# Patient Record
Sex: MALE | Race: OTHER | NOT HISPANIC OR LATINO | URBAN - METROPOLITAN AREA
[De-identification: names, ages, dates, MRNs, and addresses within clinical notes are randomized per-mention and may not be internally consistent; named-entity substitution may affect disease eponyms.]

---

## 2019-05-20 ENCOUNTER — INPATIENT (INPATIENT)
Facility: HOSPITAL | Age: 54
LOS: 0 days | Discharge: HOME | DRG: 287 | End: 2019-05-20
Attending: INTERNAL MEDICINE | Admitting: INTERNAL MEDICINE
Payer: MEDICAID

## 2019-05-20 VITALS
DIASTOLIC BLOOD PRESSURE: 89 MMHG | HEART RATE: 57 BPM | TEMPERATURE: 98 F | OXYGEN SATURATION: 98 % | RESPIRATION RATE: 18 BRPM | SYSTOLIC BLOOD PRESSURE: 154 MMHG

## 2019-05-20 VITALS — DIASTOLIC BLOOD PRESSURE: 81 MMHG | TEMPERATURE: 98 F | SYSTOLIC BLOOD PRESSURE: 126 MMHG | HEART RATE: 55 BPM

## 2019-05-20 DIAGNOSIS — R07.9 CHEST PAIN, UNSPECIFIED: ICD-10-CM

## 2019-05-20 DIAGNOSIS — I10 ESSENTIAL (PRIMARY) HYPERTENSION: ICD-10-CM

## 2019-05-20 DIAGNOSIS — Z98.890 OTHER SPECIFIED POSTPROCEDURAL STATES: Chronic | ICD-10-CM

## 2019-05-20 LAB
ALBUMIN SERPL ELPH-MCNC: 4.3 G/DL — SIGNIFICANT CHANGE UP (ref 3.3–5)
ALP SERPL-CCNC: 54 U/L — SIGNIFICANT CHANGE UP (ref 40–120)
ALT FLD-CCNC: 20 U/L — SIGNIFICANT CHANGE UP (ref 10–45)
ANION GAP SERPL CALC-SCNC: 12 MMOL/L — SIGNIFICANT CHANGE UP (ref 5–17)
APTT BLD: 32.1 SEC — SIGNIFICANT CHANGE UP (ref 27.5–36.3)
AST SERPL-CCNC: 27 U/L — SIGNIFICANT CHANGE UP (ref 10–40)
BASOPHILS # BLD AUTO: 0.03 K/UL — SIGNIFICANT CHANGE UP (ref 0–0.2)
BASOPHILS NFR BLD AUTO: 0.6 % — SIGNIFICANT CHANGE UP (ref 0–2)
BILIRUB SERPL-MCNC: 0.8 MG/DL — SIGNIFICANT CHANGE UP (ref 0.2–1.2)
BUN SERPL-MCNC: 14 MG/DL — SIGNIFICANT CHANGE UP (ref 7–23)
CALCIUM SERPL-MCNC: 9.2 MG/DL — SIGNIFICANT CHANGE UP (ref 8.4–10.5)
CHLORIDE SERPL-SCNC: 100 MMOL/L — SIGNIFICANT CHANGE UP (ref 96–108)
CK MB CFR SERPL CALC: 3.2 NG/ML — SIGNIFICANT CHANGE UP (ref 0–6.7)
CK SERPL-CCNC: 236 U/L — HIGH (ref 30–200)
CO2 SERPL-SCNC: 27 MMOL/L — SIGNIFICANT CHANGE UP (ref 22–31)
CREAT SERPL-MCNC: 0.76 MG/DL — SIGNIFICANT CHANGE UP (ref 0.5–1.3)
EOSINOPHIL # BLD AUTO: 0.19 K/UL — SIGNIFICANT CHANGE UP (ref 0–0.5)
EOSINOPHIL NFR BLD AUTO: 4 % — SIGNIFICANT CHANGE UP (ref 0–6)
GLUCOSE SERPL-MCNC: 117 MG/DL — HIGH (ref 70–99)
HCT VFR BLD CALC: 42.9 % — SIGNIFICANT CHANGE UP (ref 39–50)
HGB BLD-MCNC: 15.1 G/DL — SIGNIFICANT CHANGE UP (ref 13–17)
IMM GRANULOCYTES NFR BLD AUTO: 0.4 % — SIGNIFICANT CHANGE UP (ref 0–1.5)
INR BLD: 0.98 — SIGNIFICANT CHANGE UP (ref 0.88–1.16)
LYMPHOCYTES # BLD AUTO: 1.33 K/UL — SIGNIFICANT CHANGE UP (ref 1–3.3)
LYMPHOCYTES # BLD AUTO: 28.1 % — SIGNIFICANT CHANGE UP (ref 13–44)
MCHC RBC-ENTMCNC: 29.8 PG — SIGNIFICANT CHANGE UP (ref 27–34)
MCHC RBC-ENTMCNC: 35.2 GM/DL — SIGNIFICANT CHANGE UP (ref 32–36)
MCV RBC AUTO: 84.6 FL — SIGNIFICANT CHANGE UP (ref 80–100)
MONOCYTES # BLD AUTO: 0.52 K/UL — SIGNIFICANT CHANGE UP (ref 0–0.9)
MONOCYTES NFR BLD AUTO: 11 % — SIGNIFICANT CHANGE UP (ref 2–14)
NEUTROPHILS # BLD AUTO: 2.65 K/UL — SIGNIFICANT CHANGE UP (ref 1.8–7.4)
NEUTROPHILS NFR BLD AUTO: 55.9 % — SIGNIFICANT CHANGE UP (ref 43–77)
NRBC # BLD: 0 /100 WBCS — SIGNIFICANT CHANGE UP (ref 0–0)
PLATELET # BLD AUTO: 245 K/UL — SIGNIFICANT CHANGE UP (ref 150–400)
POTASSIUM SERPL-MCNC: 3.5 MMOL/L — SIGNIFICANT CHANGE UP (ref 3.5–5.3)
POTASSIUM SERPL-SCNC: 3.5 MMOL/L — SIGNIFICANT CHANGE UP (ref 3.5–5.3)
PROT SERPL-MCNC: 7.4 G/DL — SIGNIFICANT CHANGE UP (ref 6–8.3)
PROTHROM AB SERPL-ACNC: 11.1 SEC — SIGNIFICANT CHANGE UP (ref 10–12.9)
RBC # BLD: 5.07 M/UL — SIGNIFICANT CHANGE UP (ref 4.2–5.8)
RBC # FLD: 13 % — SIGNIFICANT CHANGE UP (ref 10.3–14.5)
SODIUM SERPL-SCNC: 139 MMOL/L — SIGNIFICANT CHANGE UP (ref 135–145)
TROPONIN T SERPL-MCNC: <0.01 NG/ML — SIGNIFICANT CHANGE UP (ref 0–0.01)
TROPONIN T SERPL-MCNC: <0.01 NG/ML — SIGNIFICANT CHANGE UP (ref 0–0.01)
WBC # BLD: 4.74 K/UL — SIGNIFICANT CHANGE UP (ref 3.8–10.5)
WBC # FLD AUTO: 4.74 K/UL — SIGNIFICANT CHANGE UP (ref 3.8–10.5)

## 2019-05-20 PROCEDURE — 93010 ELECTROCARDIOGRAM REPORT: CPT

## 2019-05-20 PROCEDURE — 99222 1ST HOSP IP/OBS MODERATE 55: CPT

## 2019-05-20 PROCEDURE — 71045 X-RAY EXAM CHEST 1 VIEW: CPT | Mod: 26

## 2019-05-20 PROCEDURE — 99285 EMERGENCY DEPT VISIT HI MDM: CPT | Mod: 25

## 2019-05-20 PROCEDURE — 93306 TTE W/DOPPLER COMPLETE: CPT | Mod: 26

## 2019-05-20 RX ORDER — FLUTICASONE PROPIONATE 50 MCG
1 SPRAY, SUSPENSION NASAL
Qty: 0 | Refills: 0 | DISCHARGE

## 2019-05-20 RX ORDER — GABAPENTIN 400 MG/1
1 CAPSULE ORAL
Qty: 0 | Refills: 0 | DISCHARGE

## 2019-05-20 RX ORDER — ASPIRIN/CALCIUM CARB/MAGNESIUM 324 MG
1 TABLET ORAL
Qty: 0 | Refills: 0 | DISCHARGE

## 2019-05-20 RX ORDER — CARBINOXAMINE MALEATE 4 MG/5ML
1 SUSPENSION, EXTENDED RELEASE ORAL
Qty: 0 | Refills: 0 | DISCHARGE

## 2019-05-20 RX ORDER — SODIUM CHLORIDE 9 MG/ML
1000 INJECTION INTRAMUSCULAR; INTRAVENOUS; SUBCUTANEOUS
Refills: 0 | Status: DISCONTINUED | OUTPATIENT
Start: 2019-05-20 | End: 2019-05-20

## 2019-05-20 RX ORDER — POTASSIUM CHLORIDE 20 MEQ
40 PACKET (EA) ORAL ONCE
Refills: 0 | Status: COMPLETED | OUTPATIENT
Start: 2019-05-20 | End: 2019-05-20

## 2019-05-20 RX ORDER — ERGOCALCIFEROL 1.25 MG/1
1 CAPSULE ORAL
Qty: 0 | Refills: 0 | DISCHARGE

## 2019-05-20 RX ORDER — AMLODIPINE BESYLATE 2.5 MG/1
5 TABLET ORAL DAILY
Refills: 0 | Status: DISCONTINUED | OUTPATIENT
Start: 2019-05-20 | End: 2019-05-20

## 2019-05-20 RX ORDER — CLOPIDOGREL BISULFATE 75 MG/1
600 TABLET, FILM COATED ORAL ONCE
Refills: 0 | Status: COMPLETED | OUTPATIENT
Start: 2019-05-20 | End: 2019-05-20

## 2019-05-20 RX ORDER — ATORVASTATIN CALCIUM 80 MG/1
40 TABLET, FILM COATED ORAL AT BEDTIME
Refills: 0 | Status: DISCONTINUED | OUTPATIENT
Start: 2019-05-20 | End: 2019-05-20

## 2019-05-20 RX ORDER — ATENOLOL AND CHLORTHALIDONE 50; 25 MG/1; MG/1
1 TABLET ORAL
Qty: 0 | Refills: 0 | DISCHARGE

## 2019-05-20 RX ORDER — ASPIRIN/CALCIUM CARB/MAGNESIUM 324 MG
81 TABLET ORAL DAILY
Refills: 0 | Status: DISCONTINUED | OUTPATIENT
Start: 2019-05-20 | End: 2019-05-20

## 2019-05-20 RX ORDER — ATORVASTATIN CALCIUM 80 MG/1
1 TABLET, FILM COATED ORAL
Qty: 30 | Refills: 1
Start: 2019-05-20 | End: 2019-07-18

## 2019-05-20 RX ORDER — HEPARIN SODIUM 5000 [USP'U]/ML
5000 INJECTION INTRAVENOUS; SUBCUTANEOUS EVERY 8 HOURS
Refills: 0 | Status: DISCONTINUED | OUTPATIENT
Start: 2019-05-20 | End: 2019-05-20

## 2019-05-20 RX ORDER — ASPIRIN/CALCIUM CARB/MAGNESIUM 324 MG
325 TABLET ORAL ONCE
Refills: 0 | Status: COMPLETED | OUTPATIENT
Start: 2019-05-20 | End: 2019-05-20

## 2019-05-20 RX ORDER — AMLODIPINE BESYLATE 2.5 MG/1
1 TABLET ORAL
Qty: 0 | Refills: 0 | DISCHARGE

## 2019-05-20 RX ORDER — GABAPENTIN 400 MG/1
100 CAPSULE ORAL
Refills: 0 | Status: DISCONTINUED | OUTPATIENT
Start: 2019-05-20 | End: 2019-05-20

## 2019-05-20 RX ORDER — ASPIRIN/CALCIUM CARB/MAGNESIUM 324 MG
81 TABLET ORAL DAILY
Refills: 0 | Status: DISCONTINUED | OUTPATIENT
Start: 2019-05-21 | End: 2019-05-20

## 2019-05-20 RX ADMIN — GABAPENTIN 100 MILLIGRAM(S): 400 CAPSULE ORAL at 19:01

## 2019-05-20 RX ADMIN — Medication 325 MILLIGRAM(S): at 07:45

## 2019-05-20 RX ADMIN — Medication 40 MILLIEQUIVALENT(S): at 12:13

## 2019-05-20 RX ADMIN — CLOPIDOGREL BISULFATE 600 MILLIGRAM(S): 75 TABLET, FILM COATED ORAL at 12:13

## 2019-05-20 RX ADMIN — SODIUM CHLORIDE 75 MILLILITER(S): 9 INJECTION INTRAMUSCULAR; INTRAVENOUS; SUBCUTANEOUS at 12:14

## 2019-05-20 RX ADMIN — AMLODIPINE BESYLATE 5 MILLIGRAM(S): 2.5 TABLET ORAL at 12:13

## 2019-05-20 NOTE — ED PROVIDER NOTE - OBJECTIVE STATEMENT
54M pmh 54M pmh HTN, nonsmoker, not diabetic p/w CP intermittently, L sided, worse w/ exertion for 1 week, no sob, no n/v, no spreading. Seen by Dr. Yusuf Robb [facility] ([place], [place]) yesterday , noted for positive stress test , sent to ED. No fever, no chills. Pain worse if any coughing or sneezing.     Fam hx of brother  of MI at 50's  Pt speaks both english & Dominique, Daughter speaks both english & Dominique 54M pmh HTN, nonsmoker, not diabetic p/w CP intermittently, L sided, worse w/ exertion for 1 week, no sob, no n/v, no spreading. Seen by Dr. Yusuf Robb [facility] ([place], [place]) yesterday , noted for positive stress test , sent to ED. No fever, no chills. Pain worse if any coughing or sneezing.     Fam hx of brother  of MI at 50's  Pt speaks both english & Dominique, Daughter speaks both english & Punjabim, pt declined  at this time.

## 2019-05-20 NOTE — DISCHARGE NOTE PROVIDER - HOSPITAL COURSE
54YM English/Calderon Speaking with strong FH CAD (Brother  of AMI in 50’s), pmh HTN c/o intermittent, L sided, non-radiating dull/aching CP, 3/10 occurs w/ coughing, sneezing and sitting forward x 1 week.  Pt denies sob, orthopnea, pnd, n/v, fever, chills, diaphoresis, LE edema, palpitations, hematuria or melena.  Pt was seen by his cardiologist Dr. Robb yesterday  and was noted to have a positive exercise stress test and was recommended to present to ED.  In ED /86, HR 61, RR 16, O2 99% RA, T 98.3, EKG SB @ 44bpm without AV block or ST/Twave changes. CXR clear per ED read. Labs notable for trop negative x1, . Pt given aspirin 325 mg x1 in ED.  Pt currently CP free.  Pt admitted to 5 uris with plan for cardiac catheterization today with Dr. Robb.  Now s/p cardiac cath 19 which was non obstructive the mid LAD myocardial bridge, EF 65%, EDP 16, R radial access without hematoma, distal pulse intact after band removal.  Pt will continue aspirin 81 mg daily, atenolol/chlorthalidone 50-25 mg, norvasc 5 mg daily.  Pt discharged with atorvastatin 20 mg daily. Pt deemed stable for discharge per Dr. Durant and will follow up with Dr. Robb on 19 at the [place] office.

## 2019-05-20 NOTE — ED PROVIDER NOTE - CLINICAL SUMMARY MEDICAL DECISION MAKING FREE TEXT BOX
54M pmh HTN, nonsmoker, not diabetic p/w CP intermittently, L sided, worse w/ exertion for 1 week, no sob, no n/v, no spreading. Seen by Dr. Yusuf Robb [facility] ([place], [place]) yesterday , noted for positive stress test , sent to ED. Fam hx of brother  of MI at 50's. Exam no acute findings, ekg not acutely ischemic. Concern ACS, less likely PE, dissection, 54M pmh HTN, nonsmoker, not diabetic p/w CP intermittently, L sided, worse w/ exertion for 1 week, no sob, no n/v, no spreading. Seen by Dr. Yusuf Gimenez ([place], [place]) yesterday , noted for positive stress test , sent to ED. Fam hx of brother  of MI at 50's. Exam no acute findings, ekg not acutely ischemic. Concern ACS, less likely PE, dissection, no STEMI criteria. SInus sam but asymptomatic. Discussed w/ Cards Cath attending & fellow, plan for cath today. Discussed Dr. Durant, accepted cards tele.

## 2019-05-20 NOTE — DISCHARGE NOTE PROVIDER - CARE PROVIDER_API CALL
Yusuf Robb (MD)  Cardiovascular Disease; Internal Medicine  [address] [place] [address]  Phone: [phone]  Fax: [phone]  Follow Up Time:

## 2019-05-20 NOTE — H&P ADULT - RS GEN PE MLT RESP DETAILS PC
breath sounds equal/normal/airway patent/good air movement/clear to auscultation bilaterally/respirations non-labored

## 2019-05-20 NOTE — ED ADULT NURSE REASSESSMENT NOTE - NS ED NURSE REASSESS COMMENT FT1
Pt assessed, on continuous monitoring.  Denies chest pain while at rest, however states pain is worse when sitting up.  VSS, lungs CTA, clear S1, S2.  Will continue to monitor.

## 2019-05-20 NOTE — H&P ADULT - ATTENDING COMMENTS
See PA note written above, for details. I reviewed the documentation.  I reviewed vitals, labs, medications, cardiac studies and additional imaging 5/20/19.  I agree with the PA's findings and plans as written above with the following additions/amendments:    54M with HTN who presents for LHC in context of abnormal exercise stress test and unstable angina. In the ED, troponin negative x1, pt given aspirin 325 mg and admitted to cardiac telemetry  Physical Exam notable for: elderly male sitting up in bed in NAD, FLAT neck veins, RRR, no MGR detected, clear lungs, flat abdomen, NTTP, no fluid wave detected, 2+ peripheral pulses, no pretibial pitting edema, no ankle edema, skin WWP throughout, dry skin, A&Ox3, independently ambulatory with no assistance  Plan for:  NPO for LHC today 5/20 with Dr. Robb  DAPT loaded with ASA/Plavix  High intensity statin Atorva 40qHS pending lipids  Holding home Atenolol given resting SB 50s, resume on 5/21 pending tele  Holding Chlorthalidone given euvolemic-dry exam and anticipated contrast load  Continue home Amlodipine for BP control  Order HbA1c, TFTs, FLP  Obtain TTE for structural assessment  Anticipated discharge pending results of coronary angiogram. May dispo today 5/20 if dx cath vs 5/21 if PCI  CLEMENTINA Berrios.  Cardiology Attending

## 2019-05-20 NOTE — ED PROVIDER NOTE - PHYSICAL EXAMINATION
VITAL SIGNS: I have reviewed nursing notes and confirm.  CONSTITUTIONAL: Well-developed; well-nourished; in no acute distress.  SKIN: Skin is warm and dry, no acute rash.  HEAD: Normocephalic; atraumatic.  EYES:  EOM intact; conjunctiva and sclera clear.  ENT: No nasal discharge; airway clear.  NECK: Supple; Voluntary FROM  CARD: No rubs appreciated, sam rate and rhythm.  RESP: No wheezes, no rales. No respiratory distress  ABD: Soft; non-distended; non-tender; no rebound or guarding  EXT: Normal ROM. No cyanosis or edema.  NEURO: Alert, oriented. Grossly unremarkable.  PSYCH: Cooperative, appropriate.

## 2019-05-20 NOTE — H&P ADULT - PROBLEM SELECTOR PLAN 2
Currently 134/86  -continue amlodipine 5mg    DVT ppx: subcutaneous heparin  Dispo: pending cardiac cath Currently 134/86  -continue amlodipine 5mg  -holding home atenolol/chlorthalidone since pt currently SB 40-50's without AV block    DVT ppx: subcutaneous heparin  Dispo: pending cardiac cath Currently 134/86  -continue home amlodipine 5mg  -holding home atenolol/chlorthalidone since pt currently SB 40-50's without AV block and asymptomatic. Pt took home dose this AM     DVT ppx: subcutaneous heparin  Dispo: pending cardiac cath

## 2019-05-20 NOTE — DISCHARGE NOTE NURSING/CASE MANAGEMENT/SOCIAL WORK - NSDCDPATPORTLINK_GEN_ALL_CORE
You can access the [email or web address], offered by [facility], by registering with the following website: [email or web address]

## 2019-05-20 NOTE — ED ADULT NURSE NOTE - CHPI ED NUR SYMPTOMS NEG
no shortness of breath/no fever/no nausea/no dizziness/no vomiting/no congestion/no diaphoresis/no syncope/no back pain/no chills

## 2019-05-20 NOTE — ED PROVIDER NOTE - PROGRESS NOTE DETAILS
Dar w/ Ced, Cath fellow, will plan for cath today. Tele admit. Accepted By Dr. Durant. Pt still sinus sam, no lightheaded, no sob, cp since arrival.

## 2019-05-20 NOTE — H&P ADULT - PROBLEM SELECTOR PLAN 1
-currently chest pain...  -trop negative x1,  f/u CE @ 12 pm and 4 pm  -aspirin 325 mg x1 in ED  -loaded with plavix....   -ECHO ordered   -NPO for cath w/ Dr. Robb today   -f/u lipid panel, HgbAIC, TSH -currently chest pain free   -trop negative x1,  f/u CE @ 12 pm and 4 pm  -aspirin 325 mg x1 in ED  -loaded with plavix....   -ECHO ordered   -NPO for cath w/ Dr. Robb today   -f/u lipid panel, HgbAIC, TSH -currently chest pain free   -trop negative x1,  f/u CE @ 12 pm and 4 pm  -aspirin 325 mg x1 in ED  -loaded with plavix 600mg x1  -ECHO ordered, EF normal per Dr. Robb   -pending fax of stress test   -NPO for cath w/ Dr. Robb today   -f/u lipid panel, HgbAIC, TSH

## 2019-05-20 NOTE — ED PROVIDER NOTE - DIAGNOSTIC INTERPRETATION
Chest x-ray interpreted by ER Physician Dr. Whitehead  Findings: heart size normal, no infiltrates, lungs fully expanded, soft tissues appear normal.

## 2019-05-20 NOTE — DISCHARGE NOTE PROVIDER - NSDCCPCAREPLAN_GEN_ALL_CORE_FT
PRINCIPAL DISCHARGE DIAGNOSIS  Diagnosis: Chest pain, unspecified type  Assessment and Plan of Treatment: The chest pain your experienced was not due to a heart attack or any blockages in the arteries of the heart. PRINCIPAL DISCHARGE DIAGNOSIS  Diagnosis: Chest pain, unspecified type  Assessment and Plan of Treatment: The chest pain your experienced was not due to a heart attack or any blockages in the arteries of the heart. The pain you experienced may have been due to a spasm of the heart muscle.  You underwent a diagnostic cardiac catheterization and the procedure as done through the right wrist.   You can remove the dressing after 24 hours and then you do not need to keep this area covered and you may shower. No soaking in a bathtub, hotub or swimming for 5 days.  Please avoid any heavy lifting  (no more than 3 to 5 lbs) or strenuous activity for five days.  If you develop any swelling, bleeding, hardening of the skin (hematoma formation), acute pain, numbness/tingling  in your arm or leg please contact your doctor immediately or call our 24/7 line: [phone]        SECONDARY DISCHARGE DIAGNOSES  Diagnosis: Hyperlipidemia  Assessment and Plan of Treatment: Please start atorvastatin (lipitor) 20 mg daily. The medication was sent to your pharamcy.    Diagnosis: Hypertension  Assessment and Plan of Treatment: Please continue your home blood pressure medications PRINCIPAL DISCHARGE DIAGNOSIS  Diagnosis: Chest pain, unspecified type  Assessment and Plan of Treatment: The chest pain your experienced was not due to a heart attack or any blockages in the arteries of the heart. The pain you experienced may have been due to a spasm of the heart muscle.  You underwent a diagnostic cardiac catheterization and the procedure as done through the right wrist.   You can remove the dressing after 24 hours and then you do not need to keep this area covered and you may shower. No soaking in a bathtub, hotub or swimming for 5 days.  Please avoid any heavy lifting  (no more than 3 to 5 lbs) or strenuous activity for five days.  If you develop any swelling, bleeding, hardening of the skin (hematoma formation), acute pain, numbness/tingling  in your arm  please contact your doctor immediately or call our 24/7 line: [phone]        SECONDARY DISCHARGE DIAGNOSES  Diagnosis: Hyperlipidemia  Assessment and Plan of Treatment: Please start atorvastatin (lipitor) 20 mg daily. The medication was sent to your pharamcy.    Diagnosis: Hypertension  Assessment and Plan of Treatment: Please continue your home blood pressure medications

## 2019-05-20 NOTE — H&P ADULT - HISTORY OF PRESENT ILLNESS
SKELETON     54YM English/Calderon Speaking with strong FH CAD (Brother  of AMI in 50’s), pmh HTN c/o intermittent, L sided, non-radiating CP, ____/10, worse w/ exertion x 1 week and reproducible with coughing?    Pt denies sob, no n/v, fever, chills  Pt was seen by his cardiologist Dr. Robb yesterday  and was noted to have a positive stress test and recommended to present to ED. No fever, no chills.  In ED /86, HR 61, RR 16, O2 99% RA, T 98.3, EKG SB @ 44bpm without AV block or ST/Twave changes. CXR clear per ED read. Labs notable for trop negative x1, . Pt given aspirin 325 mg x1. Pt admitted to 5 uris with plan for cardiac catheterization today with Dr. Robb. 54YM English/Calderon Speaking with strong FH CAD (Brother  of AMI in 50’s), pmh HTN c/o intermittent, L sided, non-radiating CP, 3/10 occurs w/ coughing, sneezing and sitting forward.  Pt denies sob, orthopnea, pnd, n/v, fever, chills, diaphoresis, LE edema, palpitations, hematuria or melena.  Pt was seen by his cardiologist Dr. Robb yesterday  and was noted to have a positive exercise stress test and was recommended to present to ED.  In ED /86, HR 61, RR 16, O2 99% RA, T 98.3, EKG SB @ 44bpm without AV block or ST/Twave changes. CXR clear per ED read. Labs notable for trop negative x1, . Pt given aspirin 325 mg x1 in ED.  Pt currently CP free.  Pt admitted to 5 uris with plan for cardiac catheterization today with Dr. Robb.

## 2019-05-20 NOTE — H&P ADULT - ADDITIONAL PE
ASA:  Mallampati:  EKG: SB @ 44 bpm, no AV block, no ST/T wave changes ASA: II  Mallampati: III  EKG: SB @ 44 bpm, no AV block, no ST/T wave changes

## 2019-05-20 NOTE — H&P ADULT - ASSESSMENT
54YM English/Calderon Speaking with strong FH CAD (Brother  of AMI in 50’s), pmh HTN c/o intermittent, L sided, non-radiating CP, 3/10 occurs w/ coughing, sneezing and sitting forward. Pt was seen by his cardiologist Dr. Robb yesterday  and was noted to have a positive exercise stress test and was recommended to present to ED. trop negative x1, . Pt given aspirin 325 mg x1 in ED.  Pt admitted to 5 uris with plan for cardiac catheterization today with Dr. Robb.       Risks & benefits of procedure and alternative therapy have been explained to the patient including but not limited to: allergic reaction, bleeding w/possible need for blood transfusion, infection, renal and vascular compromise, limb damage, arrhythmia, stroke, vessel dissection/perforation, Myocardial infarction, emergent CABG. Informed consent obtained and in chart.

## 2019-05-20 NOTE — ED ADULT NURSE NOTE - OBJECTIVE STATEMENT
Came in to ED c/o Chest pain x 1 week. 5/10, pressure, no radiation, no vomiting, no diaphoresis. No dizziness, no headache. No SOB, per patient CP started while he was gardening and worsens with exertion. No other c/o voiced at this time. Came in to ED c/o midsternal Chest pain x 1 week. 5/10, pressure, no radiation, no vomiting, no diaphoresis. No dizziness, no headache. No SOB, per patient CP started while he was gardening and worsens with exertion. No other c/o voiced at this time.

## 2019-05-20 NOTE — H&P ADULT - NSHPLABSRESULTS_GEN_ALL_CORE
15.1   4.74  )-----------( 245      ( 20 May 2019 07:34 )             42.9       05-20    139  |  100  |  14  ----------------------------<  117<H>  3.5   |  27  |  0.76    Ca    9.2      20 May 2019 07:34    TPro  7.4  /  Alb  4.3  /  TBili  0.8  /  DBili  x   /  AST  27  /  ALT  20  /  AlkPhos  54  05-20      PT/INR - ( 20 May 2019 07:34 )   PT: 11.1 sec;   INR: 0.98          PTT - ( 20 May 2019 07:34 )  PTT:32.1 sec    CARDIAC MARKERS ( 20 May 2019 07:34 )  x     / <0.01 ng/mL / 268 U/L / x     / 3.2 ng/mL            EKG:

## 2019-05-20 NOTE — H&P ADULT - NSICDXFAMILYHX_GEN_ALL_CORE_FT
FAMILY HISTORY:  Family history of acute myocardial infarction, Brother- in 50's FAMILY HISTORY:  Family history of acute myocardial infarction, Brother- in 50's  Family history of CVA, Father

## 2019-05-24 DIAGNOSIS — R07.9 CHEST PAIN, UNSPECIFIED: ICD-10-CM

## 2019-05-24 DIAGNOSIS — I10 ESSENTIAL (PRIMARY) HYPERTENSION: ICD-10-CM

## 2019-05-24 DIAGNOSIS — E78.5 HYPERLIPIDEMIA, UNSPECIFIED: ICD-10-CM

## 2019-05-24 DIAGNOSIS — Z82.49 FAMILY HISTORY OF ISCHEMIC HEART DISEASE AND OTHER DISEASES OF THE CIRCULATORY SYSTEM: ICD-10-CM

## 2019-10-31 PROCEDURE — C1887: CPT

## 2019-10-31 PROCEDURE — 93306 TTE W/DOPPLER COMPLETE: CPT

## 2019-10-31 PROCEDURE — 84443 ASSAY THYROID STIM HORMONE: CPT

## 2019-10-31 PROCEDURE — 80061 LIPID PANEL: CPT

## 2019-10-31 PROCEDURE — 71045 X-RAY EXAM CHEST 1 VIEW: CPT

## 2019-10-31 PROCEDURE — 99285 EMERGENCY DEPT VISIT HI MDM: CPT

## 2019-10-31 PROCEDURE — 85610 PROTHROMBIN TIME: CPT

## 2019-10-31 PROCEDURE — 85730 THROMBOPLASTIN TIME PARTIAL: CPT

## 2019-10-31 PROCEDURE — 83036 HEMOGLOBIN GLYCOSYLATED A1C: CPT

## 2019-10-31 PROCEDURE — 80053 COMPREHEN METABOLIC PANEL: CPT

## 2019-10-31 PROCEDURE — 93005 ELECTROCARDIOGRAM TRACING: CPT

## 2019-10-31 PROCEDURE — 84484 ASSAY OF TROPONIN QUANT: CPT

## 2019-10-31 PROCEDURE — C1769: CPT

## 2019-10-31 PROCEDURE — 82553 CREATINE MB FRACTION: CPT

## 2019-10-31 PROCEDURE — 36415 COLL VENOUS BLD VENIPUNCTURE: CPT

## 2019-10-31 PROCEDURE — 82550 ASSAY OF CK (CPK): CPT

## 2019-10-31 PROCEDURE — 85025 COMPLETE CBC W/AUTO DIFF WBC: CPT

## 2022-08-24 NOTE — ED ADULT NURSE NOTE - RESPIRATORY WDL
Breathing spontaneous and unlabored. Breath sounds clear and equal bilaterally with regular rhythm. Mohs Rapid Report Verbiage: The area of clinically evident tumor was marked with skin marking ink and appropriately hatched.  The initial incision was made following the Mohs approach through the skin.  The specimen was taken to the lab, divided into the necessary number of pieces, chromacoded and processed according to the Mohs protocol.  This was repeated in successive stages until a tumor free defect was achieved.

## 2022-10-11 NOTE — ED ADULT NURSE NOTE - NSFALLRSKUNASSIST_ED_ALL_ED
Patient reports having higher blood sugars 2-2.5 hours after having morning snack, concerned if pain and/or thyroid medication is causing this. Discussed trying different snack options to see if has an effect. Also reported having glucose lows more often than would like and having to correct. 
no

## 2023-01-20 NOTE — PATIENT PROFILE ADULT - ARE SIGNIFICANT INDICATORS COMPLETE.
Do labs  Do right hip x-rays  Continue hydroxychloroquine twice a day; continue regular eye exams per eye doctor  Continue over the counter tylenol and ibuprofen as needed    Return to clinic in 6 months No

## 2023-10-25 NOTE — PATIENT PROFILE ADULT - INTERPRETER'S NAME
Progress Note- Thanh Diamond 82 y o  male MRN: [id number]    Unit/Bed#: ENDO POOL Encounter: [id number]      Assessment and Plan:    82year old male with family history of colon cancer in his brother presenting with abdominal pain and hematochezia  Hb relatively stable  Plan for EGD and colonoscopy now   ______________________________________________________________________    Subjective:     Pt tolerated bowel prep      Medication Administration - last 24 hours from 07/16/2018 1426 to 07/17/2018 1426       Date/Time Order Dose Route Action Action by     07/17/2018 0858 allopurinol (ZYLOPRIM) tablet 100 mg 100 mg Oral Given Christel Vivi, RN     07/16/2018 2350 amitriptyline (ELAVIL) tablet 25 mg 25 mg Oral Given Carolann Perdue, RN     07/17/2018 0858 aspirin (ECOTRIN LOW STRENGTH) EC tablet 81 mg 81 mg Oral Given Christel Vivi, RN     07/17/2018 0858 atorvastatin (LIPITOR) tablet 40 mg 40 mg Oral Given Christel Vivi, RN     07/17/2018 0858 carvedilol (COREG) tablet 25 mg 25 mg Oral Given Christel Vivi, RN     07/16/2018 1702 carvedilol (COREG) tablet 25 mg 25 mg Oral Given Tariq Diamond, KARAN     07/17/2018 0906 diltiazem (CARDIZEM SR) 12 hr capsule 180 mg 180 mg Oral Given Christel Vivi, RN     07/16/2018 2350 diltiazem (CARDIZEM SR) 12 hr capsule 180 mg 180 mg Oral Given Carolann Perdue, RN     07/17/2018 0858 furosemide (LASIX) tablet 20 mg 20 mg Oral Given Christel Vivi, RN     88/16/0489 6321 mycophenolic acid (MYFORTIC) EC tablet 180 mg 180 mg Oral Given Christel Vivi, RN     55/09/6492 9071 mycophenolic acid (MYFORTIC) EC tablet 180 mg 180 mg Oral Given Tariq Diamond, RN     07/17/2018 6314 predniSONE tablet 2 5 mg 2 5 mg Oral Given Christel Vivi, RN     07/17/2018 0858 tacrolimus (PROGRAF) capsule 1 mg 1 mg Oral Given Christel Trinidad, RN     07/16/2018 1702 tacrolimus (PROGRAF) capsule 1 mg 1 mg Oral Given Tariq Diamond, KARAN     07/16/2018 5150 zolpidem (AMBIEN) tablet 10 mg
10 mg Oral Given Austin Akhtar RN     07/17/2018 0858 pantoprazole (PROTONIX) injection 40 mg 40 mg Intravenous Given Sabrina Myers RN     07/16/2018 1540 sodium chloride 0 9 % infusion 0 mL/hr Intravenous Stopped Kaleb Matt, KARAN     07/16/2018 1536 bisacodyl (DULCOLAX) EC tablet 10 mg 10 mg Oral Given Kaleb Matt RN     07/16/2018 1702 polyethylene glycol (GOLYTELY) bowel prep 4,000 mL 4,000 mL Oral Given Kaleb Matt RN     07/17/2018 0929 sodium chloride 0 9 % infusion 100 mL/hr Intravenous New Bag Sabrina Myers RN          Objective:     Vitals: Blood pressure 108/67, pulse 83, temperature 97 5 °F (36 4 °C), temperature source Tympanic, resp  rate 18, height 5' 6" (1 676 m), weight 96 kg (211 lb 10 3 oz), SpO2 98 %  ,Body mass index is 34 16 kg/m²        Intake/Output Summary (Last 24 hours) at 07/17/18 1426  Last data filed at 07/17/18 0821   Gross per 24 hour   Intake             1003 ml   Output              700 ml   Net              303 ml       Physical Exam:   General Appearance: Awake and alert, in no acute distress  Abdomen: Soft, non-tender, non-distended; bowel sounds normal; no masses or no organomegaly    Invasive Devices     Peripheral Intravenous Line            Peripheral IV 07/15/18 Right Antecubital 2 days                Lab Results:  Admission on 07/15/2018   Component Date Value    WBC 07/15/2018 10 03     RBC 07/15/2018 4 54     Hemoglobin 07/15/2018 13 8     Hematocrit 07/15/2018 42 0     MCV 07/15/2018 93     MCH 07/15/2018 30 4     MCHC 07/15/2018 32 9     RDW 07/15/2018 15 8*    MPV 07/15/2018 9 7     Platelets 18/17/9625 216     nRBC 07/15/2018 0     Neutrophils Relative 07/15/2018 57     Immat GRANS % 07/15/2018 0     Lymphocytes Relative 07/15/2018 33     Monocytes Relative 07/15/2018 8     Eosinophils Relative 07/15/2018 2     Basophils Relative 07/15/2018 0     Neutrophils Absolute 07/15/2018 5 67     Immature Grans Absolute 07/15/2018 0 03
 Lymphocytes Absolute 07/15/2018 3 30     Monocytes Absolute 07/15/2018 0 82     Eosinophils Absolute 07/15/2018 0 18     Basophils Absolute 07/15/2018 0 03     Sodium 07/15/2018 137     Potassium 07/15/2018 5 0     Chloride 07/15/2018 106     CO2 07/15/2018 24     Anion Gap 07/15/2018 7     BUN 07/15/2018 30*    Creatinine 07/15/2018 1 80*    Glucose 07/15/2018 147*    Calcium 07/15/2018 8 3     AST 07/15/2018 34     ALT 07/15/2018 23     Alkaline Phosphatase 07/15/2018 86     Total Protein 07/15/2018 7 6     Albumin 07/15/2018 3 3*    Total Bilirubin 07/15/2018 0 67     eGFR 07/15/2018 35     Lipase 07/15/2018 87     Ventricular Rate 07/15/2018 89     Atrial Rate 07/15/2018 89     LA Interval 07/15/2018 172     QRSD Interval 07/15/2018 90     QT Interval 07/15/2018 358     QTC Interval 07/15/2018 435     P Axis 07/15/2018 77     QRS Axis 07/15/2018 100     T Wave Axis 07/15/2018 88     Hemoglobin 07/15/2018 12 2     Hemoglobin 07/16/2018 11 9*    Sodium 07/16/2018 137     Potassium 07/16/2018 4 2     Chloride 07/16/2018 105     CO2 07/16/2018 26     Anion Gap 07/16/2018 6     BUN 07/16/2018 22     Creatinine 07/16/2018 1 58*    Glucose 07/16/2018 115     Glucose, Fasting 07/16/2018 115*    Calcium 07/16/2018 8 3     eGFR 07/16/2018 40     WBC 07/17/2018 7 39     RBC 07/17/2018 3 93     Hemoglobin 07/17/2018 11 9*    Hematocrit 07/17/2018 36 6     MCV 07/17/2018 93     MCH 07/17/2018 30 3     MCHC 07/17/2018 32 5     RDW 07/17/2018 15 7*    Platelets 08/90/8247 176     MPV 07/17/2018 9 9     Sodium 07/17/2018 141     Potassium 07/17/2018 3 6     Chloride 07/17/2018 108     CO2 07/17/2018 25     Anion Gap 07/17/2018 8     BUN 07/17/2018 18     Creatinine 07/17/2018 1 40*    Glucose 07/17/2018 87     Calcium 07/17/2018 8 4     eGFR 07/17/2018 47        Imaging Studies: I have personally reviewed pertinent imaging studies 
Sweety
PAST MEDICAL HISTORY:  Chronic pansinusitis     Chronic sinusitis     Seasonal allergic rhinitis

## 2025-04-09 ENCOUNTER — EMERGENCY (EMERGENCY)
Facility: HOSPITAL | Age: 60
LOS: 1 days | End: 2025-04-09
Attending: STUDENT IN AN ORGANIZED HEALTH CARE EDUCATION/TRAINING PROGRAM
Payer: COMMERCIAL

## 2025-04-09 VITALS
DIASTOLIC BLOOD PRESSURE: 108 MMHG | HEART RATE: 78 BPM | OXYGEN SATURATION: 96 % | TEMPERATURE: 98 F | HEIGHT: 69 IN | SYSTOLIC BLOOD PRESSURE: 169 MMHG | WEIGHT: 160.94 LBS | RESPIRATION RATE: 18 BRPM

## 2025-04-09 DIAGNOSIS — Z98.890 OTHER SPECIFIED POSTPROCEDURAL STATES: Chronic | ICD-10-CM

## 2025-04-09 PROCEDURE — 99282 EMERGENCY DEPT VISIT SF MDM: CPT

## 2025-04-09 PROCEDURE — 99285 EMERGENCY DEPT VISIT HI MDM: CPT | Mod: 25

## 2025-04-09 RX ORDER — ACETAMINOPHEN 500 MG/5ML
1000 LIQUID (ML) ORAL ONCE
Refills: 0 | Status: COMPLETED | OUTPATIENT
Start: 2025-04-09 | End: 2025-04-09

## 2025-04-10 VITALS
SYSTOLIC BLOOD PRESSURE: 158 MMHG | DIASTOLIC BLOOD PRESSURE: 78 MMHG | RESPIRATION RATE: 16 BRPM | OXYGEN SATURATION: 97 % | TEMPERATURE: 98 F | HEART RATE: 70 BPM

## 2025-04-10 LAB
ADD ON TEST-SPECIMEN IN LAB: SIGNIFICANT CHANGE UP
ALBUMIN SERPL ELPH-MCNC: 4.1 G/DL — SIGNIFICANT CHANGE UP (ref 3.3–5)
ALP SERPL-CCNC: 117 U/L — SIGNIFICANT CHANGE UP (ref 40–120)
ALT FLD-CCNC: 22 U/L — SIGNIFICANT CHANGE UP (ref 10–45)
ANION GAP SERPL CALC-SCNC: 13 MMOL/L — SIGNIFICANT CHANGE UP (ref 5–17)
ANION GAP SERPL CALC-SCNC: 14 MMOL/L — SIGNIFICANT CHANGE UP (ref 5–17)
APPEARANCE UR: CLEAR — SIGNIFICANT CHANGE UP
AST SERPL-CCNC: 22 U/L — SIGNIFICANT CHANGE UP (ref 10–40)
BACTERIA # UR AUTO: NEGATIVE /HPF — SIGNIFICANT CHANGE UP
BASOPHILS # BLD AUTO: 0.03 K/UL — SIGNIFICANT CHANGE UP (ref 0–0.2)
BASOPHILS NFR BLD AUTO: 0.3 % — SIGNIFICANT CHANGE UP (ref 0–2)
BILIRUB SERPL-MCNC: 1 MG/DL — SIGNIFICANT CHANGE UP (ref 0.2–1.2)
BILIRUB UR-MCNC: NEGATIVE — SIGNIFICANT CHANGE UP
BUN SERPL-MCNC: 12 MG/DL — SIGNIFICANT CHANGE UP (ref 7–23)
BUN SERPL-MCNC: 12 MG/DL — SIGNIFICANT CHANGE UP (ref 7–23)
CALCIUM SERPL-MCNC: 8.5 MG/DL — SIGNIFICANT CHANGE UP (ref 8.4–10.5)
CALCIUM SERPL-MCNC: 9 MG/DL — SIGNIFICANT CHANGE UP (ref 8.4–10.5)
CAST: 0 /LPF — SIGNIFICANT CHANGE UP (ref 0–4)
CHLORIDE SERPL-SCNC: 100 MMOL/L — SIGNIFICANT CHANGE UP (ref 96–108)
CHLORIDE SERPL-SCNC: 104 MMOL/L — SIGNIFICANT CHANGE UP (ref 96–108)
CO2 SERPL-SCNC: 20 MMOL/L — LOW (ref 22–31)
CO2 SERPL-SCNC: 21 MMOL/L — LOW (ref 22–31)
COLOR SPEC: YELLOW — SIGNIFICANT CHANGE UP
CREAT SERPL-MCNC: 1.43 MG/DL — HIGH (ref 0.5–1.3)
CREAT SERPL-MCNC: 1.5 MG/DL — HIGH (ref 0.5–1.3)
DIFF PNL FLD: ABNORMAL
EGFR: 54 ML/MIN/1.73M2 — LOW
EGFR: 54 ML/MIN/1.73M2 — LOW
EGFR: 57 ML/MIN/1.73M2 — LOW
EGFR: 57 ML/MIN/1.73M2 — LOW
EOSINOPHIL # BLD AUTO: 0.12 K/UL — SIGNIFICANT CHANGE UP (ref 0–0.5)
EOSINOPHIL NFR BLD AUTO: 1.3 % — SIGNIFICANT CHANGE UP (ref 0–6)
FLUAV AG NPH QL: SIGNIFICANT CHANGE UP
FLUBV AG NPH QL: SIGNIFICANT CHANGE UP
GAS PNL BLDV: SIGNIFICANT CHANGE UP
GLUCOSE SERPL-MCNC: 106 MG/DL — HIGH (ref 70–99)
GLUCOSE SERPL-MCNC: 117 MG/DL — HIGH (ref 70–99)
GLUCOSE UR QL: NEGATIVE MG/DL — SIGNIFICANT CHANGE UP
HCT VFR BLD CALC: 40.9 % — SIGNIFICANT CHANGE UP (ref 39–50)
HGB BLD-MCNC: 14.1 G/DL — SIGNIFICANT CHANGE UP (ref 13–17)
IMM GRANULOCYTES NFR BLD AUTO: 0.3 % — SIGNIFICANT CHANGE UP (ref 0–0.9)
KETONES UR-MCNC: NEGATIVE MG/DL — SIGNIFICANT CHANGE UP
LEUKOCYTE ESTERASE UR-ACNC: NEGATIVE — SIGNIFICANT CHANGE UP
LIDOCAIN IGE QN: 21 U/L — SIGNIFICANT CHANGE UP (ref 7–60)
LYMPHOCYTES # BLD AUTO: 1.16 K/UL — SIGNIFICANT CHANGE UP (ref 1–3.3)
LYMPHOCYTES # BLD AUTO: 12.7 % — LOW (ref 13–44)
MCHC RBC-ENTMCNC: 29.1 PG — SIGNIFICANT CHANGE UP (ref 27–34)
MCHC RBC-ENTMCNC: 34.5 G/DL — SIGNIFICANT CHANGE UP (ref 32–36)
MCV RBC AUTO: 84.3 FL — SIGNIFICANT CHANGE UP (ref 80–100)
MONOCYTES # BLD AUTO: 1.22 K/UL — HIGH (ref 0–0.9)
MONOCYTES NFR BLD AUTO: 13.4 % — SIGNIFICANT CHANGE UP (ref 2–14)
NEUTROPHILS # BLD AUTO: 6.56 K/UL — SIGNIFICANT CHANGE UP (ref 1.8–7.4)
NEUTROPHILS NFR BLD AUTO: 72 % — SIGNIFICANT CHANGE UP (ref 43–77)
NITRITE UR-MCNC: NEGATIVE — SIGNIFICANT CHANGE UP
NRBC BLD AUTO-RTO: 0 /100 WBCS — SIGNIFICANT CHANGE UP (ref 0–0)
PH UR: 6 — SIGNIFICANT CHANGE UP (ref 5–8)
PLATELET # BLD AUTO: 257 K/UL — SIGNIFICANT CHANGE UP (ref 150–400)
POTASSIUM SERPL-MCNC: 4.3 MMOL/L — SIGNIFICANT CHANGE UP (ref 3.5–5.3)
POTASSIUM SERPL-MCNC: 4.4 MMOL/L — SIGNIFICANT CHANGE UP (ref 3.5–5.3)
POTASSIUM SERPL-SCNC: 4.3 MMOL/L — SIGNIFICANT CHANGE UP (ref 3.5–5.3)
POTASSIUM SERPL-SCNC: 4.4 MMOL/L — SIGNIFICANT CHANGE UP (ref 3.5–5.3)
PROT SERPL-MCNC: 7.3 G/DL — SIGNIFICANT CHANGE UP (ref 6–8.3)
PROT UR-MCNC: NEGATIVE MG/DL — SIGNIFICANT CHANGE UP
RBC # BLD: 4.85 M/UL — SIGNIFICANT CHANGE UP (ref 4.2–5.8)
RBC # FLD: 13 % — SIGNIFICANT CHANGE UP (ref 10.3–14.5)
RBC CASTS # UR COMP ASSIST: 4 /HPF — SIGNIFICANT CHANGE UP (ref 0–4)
REVIEW: SIGNIFICANT CHANGE UP
RSV RNA NPH QL NAA+NON-PROBE: SIGNIFICANT CHANGE UP
SARS-COV-2 RNA SPEC QL NAA+PROBE: SIGNIFICANT CHANGE UP
SODIUM SERPL-SCNC: 135 MMOL/L — SIGNIFICANT CHANGE UP (ref 135–145)
SODIUM SERPL-SCNC: 137 MMOL/L — SIGNIFICANT CHANGE UP (ref 135–145)
SOURCE RESPIRATORY: SIGNIFICANT CHANGE UP
SP GR SPEC: 1.01 — SIGNIFICANT CHANGE UP (ref 1–1.03)
SQUAMOUS # UR AUTO: 0 /HPF — SIGNIFICANT CHANGE UP (ref 0–5)
UROBILINOGEN FLD QL: 0.2 MG/DL — SIGNIFICANT CHANGE UP (ref 0.2–1)
WBC # BLD: 9.12 K/UL — SIGNIFICANT CHANGE UP (ref 3.8–10.5)
WBC # FLD AUTO: 9.12 K/UL — SIGNIFICANT CHANGE UP (ref 3.8–10.5)
WBC UR QL: 0 /HPF — SIGNIFICANT CHANGE UP (ref 0–5)

## 2025-04-10 PROCEDURE — 85014 HEMATOCRIT: CPT

## 2025-04-10 PROCEDURE — 87637 SARSCOV2&INF A&B&RSV AMP PRB: CPT

## 2025-04-10 PROCEDURE — 99284 EMERGENCY DEPT VISIT MOD MDM: CPT | Mod: 25

## 2025-04-10 PROCEDURE — 82803 BLOOD GASES ANY COMBINATION: CPT

## 2025-04-10 PROCEDURE — 84132 ASSAY OF SERUM POTASSIUM: CPT

## 2025-04-10 PROCEDURE — 83605 ASSAY OF LACTIC ACID: CPT

## 2025-04-10 PROCEDURE — 80048 BASIC METABOLIC PNL TOTAL CA: CPT

## 2025-04-10 PROCEDURE — 96374 THER/PROPH/DIAG INJ IV PUSH: CPT | Mod: XU

## 2025-04-10 PROCEDURE — 81001 URINALYSIS AUTO W/SCOPE: CPT

## 2025-04-10 PROCEDURE — 85025 COMPLETE CBC W/AUTO DIFF WBC: CPT

## 2025-04-10 PROCEDURE — 83690 ASSAY OF LIPASE: CPT

## 2025-04-10 PROCEDURE — 82947 ASSAY GLUCOSE BLOOD QUANT: CPT

## 2025-04-10 PROCEDURE — 96375 TX/PRO/DX INJ NEW DRUG ADDON: CPT

## 2025-04-10 PROCEDURE — 87086 URINE CULTURE/COLONY COUNT: CPT

## 2025-04-10 PROCEDURE — 74177 CT ABD & PELVIS W/CONTRAST: CPT | Mod: 26

## 2025-04-10 PROCEDURE — 82435 ASSAY OF BLOOD CHLORIDE: CPT

## 2025-04-10 PROCEDURE — 85018 HEMOGLOBIN: CPT

## 2025-04-10 PROCEDURE — 82550 ASSAY OF CK (CPK): CPT

## 2025-04-10 PROCEDURE — 80053 COMPREHEN METABOLIC PANEL: CPT

## 2025-04-10 PROCEDURE — 84295 ASSAY OF SERUM SODIUM: CPT

## 2025-04-10 PROCEDURE — 36415 COLL VENOUS BLD VENIPUNCTURE: CPT

## 2025-04-10 PROCEDURE — 74177 CT ABD & PELVIS W/CONTRAST: CPT | Mod: MC

## 2025-04-10 PROCEDURE — 82330 ASSAY OF CALCIUM: CPT

## 2025-04-10 RX ORDER — CEFPODOXIME PROXETIL 200 MG/1
1 TABLET, FILM COATED ORAL
Qty: 10 | Refills: 0
Start: 2025-04-10 | End: 2025-04-14

## 2025-04-10 RX ORDER — KETOROLAC TROMETHAMINE 30 MG/ML
15 INJECTION, SOLUTION INTRAMUSCULAR; INTRAVENOUS ONCE
Refills: 0 | Status: DISCONTINUED | OUTPATIENT
Start: 2025-04-10 | End: 2025-04-10

## 2025-04-10 RX ORDER — SODIUM CHLORIDE 9 G/1000ML
1000 INJECTION, SOLUTION INTRAVENOUS ONCE
Refills: 0 | Status: COMPLETED | OUTPATIENT
Start: 2025-04-10 | End: 2025-04-10

## 2025-04-10 RX ORDER — TAMSULOSIN HYDROCHLORIDE 0.4 MG/1
0.4 CAPSULE ORAL ONCE
Refills: 0 | Status: COMPLETED | OUTPATIENT
Start: 2025-04-10 | End: 2025-04-10

## 2025-04-10 RX ORDER — TAMSULOSIN HYDROCHLORIDE 0.4 MG/1
1 CAPSULE ORAL
Qty: 7 | Refills: 0
Start: 2025-04-10 | End: 2025-04-16

## 2025-04-10 RX ADMIN — Medication 1000 MILLILITER(S): at 00:11

## 2025-04-10 RX ADMIN — Medication 400 MILLIGRAM(S): at 00:11

## 2025-04-10 RX ADMIN — SODIUM CHLORIDE 2000 MILLILITER(S): 9 INJECTION, SOLUTION INTRAVENOUS at 03:18

## 2025-04-10 RX ADMIN — KETOROLAC TROMETHAMINE 15 MILLIGRAM(S): 30 INJECTION, SOLUTION INTRAMUSCULAR; INTRAVENOUS at 03:11

## 2025-04-10 RX ADMIN — TAMSULOSIN HYDROCHLORIDE 0.4 MILLIGRAM(S): 0.4 CAPSULE ORAL at 03:11

## 2025-04-15 PROBLEM — I10 ESSENTIAL (PRIMARY) HYPERTENSION: Chronic | Status: ACTIVE | Noted: 2019-05-20

## 2025-05-28 NOTE — ED PROVIDER NOTE - PRIOR EKG STATUS
Initial SW/CM Assessment/Plan of Care Note     Baseline Assessment  51 year old admitted 5/24/2025 as Inpatient with a diagnosis of Wound infection.   Prior to admission patient was living with Adult children and residing at [facility]    .  Patient does not  have a Power of  for Healthcare.  Document is not activated. Patient’s Primary Care Provider is PcpLisa.     Progress Note  Met with pt who reports she lives with her son. Pt reports she has been bed bound for the past 2 years and family assisted with ADLs. Pt is active with [facility], unable to recall name of company. Pt has Hx of HH, no HX of [facility] placement. Pt has Rx coverage.     Plan  Patient/Family Discharge Goal: Other (comment)   Is patient/family goal achievable: Yes    SW/CM - Recommendations for Discharge: LTAC, Sub-acute nursing home     Barriers to Discharge  Identified Barriers to Discharge/Transition Planning:          Anticipate patient will need post-hospital services. Necessary services are available.  Anticipate patient cannot return to the environment from which patient entered the hospital.   Anticipate patient cannot provide self-care at discharge.    Refer to SW/CM Flowsheet for objective data.     Medical History  Past Medical History:   Diagnosis Date    Asthma (CMD)     Diabetes mellitus  (CMD)     Essential (primary) hypertension        Prior to Admission Status  Functional Status  Ambulation: Other (comment) (bed bound)  Bathing: Family  Dressing: Family  Toileting: Family  Meal Preparation: Family  Shopping: Family  Medication Preparation: Family  Medication Administration: Family  Housekeeping: Family  Laundry: Family    Agency/Support  Type of Services Prior to Hospitalization: None               Support Systems: Children, Family members, Friends   Home Devices/Equipment: Home Oxygen (T) pt unable to recall         Mobility Assist Devices: None  Sensory Support Devices: Eyeglasses    Prior Function                Current  Function  Last Filed Values       None            Therapy Recommendations for Discharge:   PT:      Last Filed Values       None          OT:       Last Filed Values       None          SLP:    Last Filed Values       None            Insurance  Primary: MEDICARE  Secondary: [place] MEDICAID    Disposition Recommendations:  KAYCEE/BAKARI recommendation for discharge: LTAC, Sub-acute nursing home     30 Day Readmission:  Is patient a 30 day readmission?: No (05/28/25 1143 : Citlali Guzman LSW)              there is no prior EKG available for comparison